# Patient Record
(demographics unavailable — no encounter records)

---

## 2024-11-18 NOTE — REVIEW OF SYSTEMS
[Sore Throat] : sore throat [Negative] : Genitourinary [Fever] : no fever [Headache] : no headache [Eye Discharge] : no eye discharge [Eye Redness] : no eye redness [Ear Pain] : no ear pain [Nasal Discharge] : no nasal discharge [Nasal Congestion] : no nasal congestion [Vomiting] : no vomiting [Weakness] : no weakness [Myalgia] : no myalgia [Rash] : no rash

## 2024-11-18 NOTE — PHYSICAL EXAM
[Erythematous Oropharynx] : erythematous oropharynx [Supple] : supple [Clear to Auscultation Bilaterally] : clear to auscultation bilaterally [NL] : warm, clear [Inflamed Tongue] : tongue not inflamed [Enlarged Tonsils] : tonsils not enlarged [Vesicles] : no vesicles [Exudate] : no exudate [Palate petechiae] : palate without petechiae [de-identified] : minimal

## 2024-11-18 NOTE — HISTORY OF PRESENT ILLNESS
[de-identified] : sore throat [FreeTextEntry6] : presents with a new onset sore throat today school sent her out no fever usual potential exposures

## 2024-12-16 NOTE — HISTORY OF PRESENT ILLNESS
[de-identified] : oozing from L earlobe, on and off  [FreeTextEntry6] : oozing from lobe, started 6 months ago comes and goes can be pussy

## 2024-12-16 NOTE — PHYSICAL EXAM
[NL] : warm, clear [FreeTextEntry3] : earlobe earring hole open with some oozing from neighboring small hematoma/abscess, drainage a bit yellow localized near hole

## 2024-12-16 NOTE — DISCUSSION/SUMMARY
[FreeTextEntry1] : local abscess/hematoma near lobe drained mupirocin keep clean and irrigate wear loops f/u if sx worsen

## 2025-01-04 NOTE — REVIEW OF SYSTEMS
[Fever] : fever [Chills] : chills [Malaise] : malaise [Negative] : Genitourinary [Headache] : no headache [Eye Discharge] : no eye discharge [Ear Pain] : no ear pain [Nasal Discharge] : no nasal discharge [Sore Throat] : no sore throat [Cough] : no cough [Vomiting] : no vomiting [Diarrhea] : no diarrhea

## 2025-01-04 NOTE — DISCUSSION/SUMMARY
[FreeTextEntry1] : Recommend supportive care including antipyretics, fluids, rest, and nasal saline followed by nasal suction. Discussed risks & benefits of oseltamivir. Potential side effect of oseltamivir includes but not limited to nausea, vomiting, and sleep disruption. + FLU A  patient refuses med.

## 2025-01-04 NOTE — HISTORY OF PRESENT ILLNESS
[de-identified] : 103 and flu symptoms [FreeTextEntry6] : presents with 1 day hx no flu vaccine as yet

## 2025-01-09 NOTE — PHYSICAL EXAM
[Erythema] : erythema [Purulent Effusion] : purulent effusion [Clear to Auscultation Bilaterally] : clear to auscultation bilaterally [NL] : warm, clear [FreeTextEntry3] : red/yellow effusions no kristine purulence

## 2025-01-09 NOTE — REVIEW OF SYSTEMS
[Ear Pain] : ear pain [Cough] : cough [Negative] : Genitourinary [Fever] : no fever [Headache] : no headache [Eye Discharge] : no eye discharge [Eye Redness] : no eye redness [Nasal Discharge] : no nasal discharge [Nasal Congestion] : no nasal congestion [Sore Throat] : no sore throat

## 2025-01-09 NOTE — HISTORY OF PRESENT ILLNESS
[de-identified] : flu A and cough and earache [FreeTextEntry6] : patient was diagnosed with the flu A last weekend here. has had a persistent cough no fevers and earache for past few days.

## 2025-01-09 NOTE — DISCUSSION/SUMMARY
[FreeTextEntry1] : B/L effusions and inflammation essentially BOM  will treat as such other symptoms are flu related and will resolve in coming days follow up 10 days

## 2025-01-16 NOTE — HISTORY OF PRESENT ILLNESS
[de-identified] : re ck ears and chest [FreeTextEntry6] : On Amoxil d 7 On Zmax d 5 dx w BOM and Pneumonia doing much better since on Zmax feels tired, sent home today from school due to fatigue

## 2025-02-13 NOTE — DISCUSSION/SUMMARY
[FreeTextEntry1] : FLU GIVEN TODAY Provided counseling on the diseases to be vaccinated against as well as the risks/benefits of providing and withholding recommended vaccines to be given today to BETTIE .All questions were answered and the parent verbalized understanding.

## 2025-04-14 NOTE — ADDENDUM
[FreeTextEntry1] : Attending Statement: Pt seen and evaluated by NP student Odessa Patton followed by me. History reviewed. Discussed and agree with clinician's assessment and plan.

## 2025-04-14 NOTE — PLAN
[Contact was Attempted] : contact was attempted [TextBox_9] : linkage to therapy, pediatric neurology appt on 5/20 at 1 PM and [place] scales provided for ADHD evaluation  [TextBox_11] : none [TextBox_26] : self-referred - school HIPAA not signed

## 2025-04-14 NOTE — HISTORY OF PRESENT ILLNESS
[FreeTextEntry1] : Robel Greene is an 8-year-old female in the 3rd grade at [employer]. She lives with her mother, father, and pet cat. 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 She has no known family psychiatric history and no prior psychiatric treatment. She previously participated in therapy but stopped in June 2023 after therapist left practice; support with the school psychologist continued through June 2024. She is currently not engaged in any therapeutic services, and her mother is seeking assistance in reconnecting with mental health resources.  During the clinical interview, Robel presented as calm, cooperative, and developmentally appropriate. She expressed emotional vulnerability, stating that "small things give me big emotions," including sadness, anger, or anxiety. She describes experiencing intrusive "what if" thoughts, which she recognizes may not be realistic but still cause significant worry. These tend to occur when she feels like she's in trouble or when peers are unkind. Robel reports being able to cope by talking to friends or listening to music, depending on her environment. She also describes moments when her "inside feels fast but outside is slow," indicating internal restlessness or racing thoughts. Attention is described as fair; she often needs to express herself verbally ("get all her talking out") before she can focus in class. There is one reported incident where she hit her head out of frustration following an episode of embarrassment. She expressed regret and a desire to "go back in time" afterward. She denies current self-harm behavior, but the incident reflects poor frustration tolerance and emotional impulsivity.  Robel reports good sleep and increased appetite, which she attributes to a "growth spurt." She denies bullying, abuse, depressive, manic, or psychotic symptoms. No acute mood or safety concerns noted at this time. Denies current self-harm. Denies past or present suicidal ideation, intent, or plan. Denies past or present homicidal ideation, intent, or plan.  Collateral from Mother: Mother reports Robel had a speech delay in early childhood, primarily expressive rather than receptive. Emotional outbursts (e.x, going from "0 to 100") remain a concern, potentially tied to frustration from speech difficulties and challenges with impulse control. Mother shared that Robel often forms rigid internal rules or expectations and becomes upset when they are not met (e.x, refusing birthday cake due to missing the song). Emotional dysregulation appears more prominent in the school setting. Mother is currently seeking outpatient therapy and developmental evaluation for support. [FreeTextEntry2] : hx of therapy  [FreeTextEntry3] : none

## 2025-04-14 NOTE — PHYSICAL EXAM
[Normal] : normal [None] : none [Cooperative] : cooperative [Euthymic] : euthymic [Full] : full [Clear] : clear [Linear/Goal Directed] : linear/goal directed [Preoccupations/Ruminations] : preoccupations/ruminations [Average] : average [WNL] : within normal limits [de-identified] : slightly fidgety

## 2025-05-19 NOTE — HISTORY OF PRESENT ILLNESS
[Premenarchal] : premenarchal [Headaches] : no headaches [Visual Symptoms] : no ~T visual symptoms [Polyuria] : no polyuria [Polydipsia] : no polydipsia [FreeTextEntry2] :  Robel is an 8 year old female who presents for follow up of central DI   In 11/2017 she developed intermittent polydipsia with polyuria. Mother states that PMD evaluated patient in February of 2018 and found urine to be dilute with undetectable  on 2/9/18. Mother states that at that time she was soaking one diaper per hour and drinking over 50oz per day of milk and water. MRI performed on 3/6/18 that showed a normal pituitary gland for age. Patient was evaluated by [facility] for DI at 22 months of age (5/10/18). Patient was on DDAVP 0.025 mg daily that was increased to 0.05 mg daily on 5/10/18 that is given at 7PM. In June 2018 she increased to DDAVP 0.075mg x1 week when she was ill with URI and dose was changed to 0.05 mg once a day and then 0.05 mg BID in the beginning of July 2018.  At the initial visit in July 2018 her MRI was normal and other pituitary screening were normal. On review of growth charts, her weight was consistently at the 50th percentile then gradually decreased to the 25th percentile at 1 years of age. Patient's length noted to decline from the 75th percentile with gradual decline to the 50th percentile. Urine in 7/18 showed specific gravity of 1.004, Na 136.  Robel is a very active child, she is doing well developmentally. She did have a speech evaluation and the family was told there was mild delay that does not warrant any therapy. Robel was seen by neuro for unusual arm movements noted on a video. There was no evidence of seizures. Robel underwent surgery to have myringotomy tubes placed and an adenoidectomy. The surgery went well and there were no issues with the diabetes insipidus.  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 Last MRI August 2023. During follow up Robel had needed increase in her DDAVP dose. In 2023 she received an extra dose by mistake, subsequent dose was held and then she required no further doses, The diminished need for medication was abrupt as she had missed a dose shortly before this and had polyuria in school. Today Robel has been doing well overall. Has been off DDAVP since July 2023 and doing very well.  Follow up labs have all been normal. She has a good energy level .  She had a transient episode of increased thirst that appetite which resolved, Robel does not wake up feeling thirsty in the morning.  She feels that she goes to the bathroom the same as everybody else in school Robel is in therapy as she is having some issues with interpersonal relationships in school, this appears to not be an issue specifically for Robel as multiple other children in the class are also having some issues this year

## 2025-05-19 NOTE — PHYSICAL EXAM
[Healthy Appearing] : healthy appearing [Well Nourished] : well nourished [Interactive] : interactive [Well formed] : well formed [Normally Set] : normally set [Normal S1 and S2] : normal S1 and S2 [Clear to Ausculation Bilaterally] : clear to auscultation bilaterally [Abdomen Soft] : soft [Abdomen Tenderness] : non-tender [] : no hepatosplenomegaly [1] : was Arun stage 1 [Normal for Age] : was normal for age [Normal Appearance] : normal in appearance [Normal] : normal  [Murmur] : no murmurs

## 2025-05-19 NOTE — DISCUSSION/SUMMARY
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  This is a very unusual presentation.  Pituitary functions have been normal and last repeat MRI is also normal.  Robel has been doing excellent off of therapy with normal labs. Given patient may have tendency to drink more when active, she may have some residual changes in her  axis that either makes her thirst response more robust given her history or makes her set point to respond to hypernatremia slightly higher than normal. Given patient is asymptomatic and regulating appropriately on her own, will continue to monitor.   Plan: Repeat MRI next summer Mother to call in 2 months for repeat lab order Mother also instructed to call if patient becomes sick, especially severe illness causing vomiting, diarrhea or dehydration.  RTC in 6 months

## 2025-05-19 NOTE — HISTORY OF PRESENT ILLNESS
[Premenarchal] : premenarchal [Headaches] : no headaches [Visual Symptoms] : no ~T visual symptoms [Polyuria] : no polyuria [Polydipsia] : no polydipsia [FreeTextEntry2] :  Robel is an 8 year old female who presents for follow up of central DI   In 11/2017 she developed intermittent polydipsia with polyuria. Mother states that PMD evaluated patient in February of 2018 and found urine to be dilute with undetectable  on 2/9/18. Mother states that at that time she was soaking one diaper per hour and drinking over 50oz per day of milk and water. MRI performed on 3/6/18 that showed a normal pituitary gland for age. Patient was evaluated by [facility] for DI at 22 months of age (5/10/18). Patient was on DDAVP 0.025 mg daily that was increased to 0.05 mg daily on 5/10/18 that is given at 7PM. In June 2018 she increased to DDAVP 0.075mg x1 week when she was ill with URI and dose was changed to 0.05 mg once a day and then 0.05 mg BID in the beginning of July 2018.  At the initial visit in July 2018 her MRI was normal and other pituitary screening were normal. On review of growth charts, her weight was consistently at the 50th percentile then gradually decreased to the 25th percentile at 1 years of age. Patient's length noted to decline from the 75th percentile with gradual decline to the 50th percentile. Urine in 7/18 showed specific gravity of 1.004, Na 136.  Robel is a very active child, she is doing well developmentally. She did have a speech evaluation and the family was told there was mild delay that does not warrant any therapy. Robel was seen by neuro for unusual arm movements noted on a video. There was no evidence of seizures. Robel underwent surgery to have myringotomy tubes placed and an adenoidectomy. The surgery went well and there were no issues with the diabetes insipidus.  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 Last MRI August 2023. During follow up Robel had needed increase in her DDAVP dose. In 2023 she received an extra dose by mistake, subsequent dose was held and then she required no further doses, The diminished need for medication was abrupt as she had missed a dose shortly before this and had polyuria in school. Today Robel has been doing well overall. Has been off DDAVP since July 2023 and doing very well.  Follow up labs have all been normal. She has a good energy level .  She had a transient episode of increased thirst that appetite which resolved, Robel does not wake up feeling thirsty in the morning.  She feels that she goes to the bathroom the same as everybody else in school Robel is in therapy as she is having some issues with interpersonal relationships in school, this appears to not be an issue specifically for Robel as multiple other children in the class are also having some issues this year
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
## 2025-05-28 NOTE — DISCUSSION/SUMMARY
[FreeTextEntry1] : discussed with mother and then with mother and child stressed the importance of meals and hydration baldemar and school explored therapist and speaking to a diff person about feelings explored past and ways to improve social interactions cough-observe may try mucinex encouraged extracurricular activities  time spent face to face 40m

## 2025-05-30 NOTE — PHYSICAL EXAM
[NL] : warm, clear [Wheezing] : no wheezing [Rales] : no rales [FreeTextEntry7] : coarse breath sounds

## 2025-07-01 NOTE — CONSULT LETTER
[Dear  ___] : Dear  [unfilled], [Consult Letter:] : I had the pleasure of evaluating your patient, [unfilled]. [Please see my note below.] : Please see my note below. [Consult Closing:] : Thank you very much for allowing me to participate in the care of this patient.  If you have any questions, please do not hesitate to contact me. [FreeTextEntry3] : Volodymyr Giang    Certified Psychiatric Nurse Practitioner Pediatric Neurology [facility]

## 2025-07-01 NOTE — DATA REVIEWED
[FreeTextEntry1] : [place] questionnaires were completed by parents and teacher.  Parents responses: Inattention 0/9 Hyperactivity 0/9 ODD: 2/8 Conduct disorder: 0/14 Anxiety/ Depression: 3/7  Teachers responses: Inattention 0/9 Hyperactivity 0/9 ODD/ Conduct: 2/10 Anxiety/ Depression: 1/7  - ADHD Performance questions: Parents: mathematics, relationship with peers, participation in organized activities  Teachers: relationship with peers

## 2025-07-01 NOTE — REVIEW OF SYSTEMS
[Patient Intake Form Reviewed] : patient intake form reviewed [Normal] : Hematologic/Lymphatic [FreeTextEntry8] : see hpi  [de-identified] : see hpi

## 2025-07-01 NOTE — END OF VISIT
[Time Spent: ___ minutes] : I have spent [unfilled] minutes of time on the encounter which excludes teaching and separately reported services. [FreeTextEntry3] :  I, Dr. Whitlock, personally performed the evaluation and management (E/M) services for this new patient.? That E/M includes conducting the clinically appropriate initial history &/or exam, assessing all conditions, and establishing the plan of care.? Today, my SPENCER, Layar was here to observe my evaluation and management service for this patient & follow plan of care established by me going forward.

## 2025-07-01 NOTE — ASSESSMENT
[FreeTextEntry1] : BETTIE is a 8 y/o female presenting for behavioral and attention concerns. She had ST for difficulty with expressive speech. She has a medical plan in school for diabetes insipidus. Patient is currently seeing a therapist at [facility] in [place] where she will be undergoing evaluation for anxiety/OCD on July 9, 2025, Patient was screened for problems such as anxiety, depression, and sleep problems.    [place] parent and teacher forms reviewed. Patient does not meet criteria for ADHD.

## 2025-07-01 NOTE — REVIEW OF SYSTEMS
[Patient Intake Form Reviewed] : patient intake form reviewed [Normal] : Hematologic/Lymphatic [FreeTextEntry8] : see hpi  [de-identified] : see hpi

## 2025-07-01 NOTE — HISTORY OF PRESENT ILLNESS
[FreeTextEntry1] : ROBEL is a 10 y/o female here for an initial visit for inattention and behavioral concerns. Patient lives with parents and is the only child. Robel has a good relationship with her parents.   Early development: ROBEL was born full term via . She was discharged home with mother. EI-Expressive ST which started at 3 years old until the 1st grade.   Robel has an appointment with [facility] in [place] on  where she is going to be evaluated for anxiety/OCD. She is also receiving therapy at the same clinic.   Teachers have no concerns for inattention/hyperactivity. Mom reported no inattention concerns either. She would just like to rule out ADHD.   EDUCATION: -School: 4th grade at [employer]  -Public school -HX of [employer] for school due to diabetes insipidus  -General education classroom -Academics-3s, and 4s.  HOME BEHAVIORS: Mom is not reporting any inattention/hyperactivity. Robel has difficulty with regulating her emotions. Mom wants to rule out ADHD.   RELATIONSHIPS: Gets along well with peers with some strained moments. Robel is big on being a perfectionist.   EMOTIONAL Denies anxiety or depression. Robel denies any current SI, intent or plan. Mom also offers no acute safety concerns.   SLEEP Goes to bed at 8:30PM and 7AM   ACTIVITIES/INTERESTS: swimming, soccer, art class and theater   MEDICAL HISTORY:  Denies cardiac hx Denies a history of tics Denies history of starting spells, twitching, seizure-like activity.  Allergies: Denies  Current Meds: None   FAMILY HISTORY: Family history of ADHD: None  Family history of anxiety or depression: Maternal and Paternal sides have anxiety and depression  Denies family cardiac history or unexplained deaths before age of 40-50

## 2025-07-01 NOTE — CONSULT LETTER
[Dear  ___] : Dear  [unfilled], [Consult Letter:] : I had the pleasure of evaluating your patient, [unfilled]. [Please see my note below.] : Please see my note below. [Consult Closing:] : Thank you very much for allowing me to participate in the care of this patient.  If you have any questions, please do not hesitate to contact me. [FreeTextEntry3] : Volodymyr Giang    Certified Psychiatric Nurse Practitioner Pediatric Neurology [facility]

## 2025-07-01 NOTE — DATA REVIEWED
[FreeTextEntry1] : [place] questionnaires were completed by parents and teacher.  Parents responses: Inattention 0/9 Hyperactivity 0/9 ODD: 2/8 Conduct disorder: 0/14 Anxiety/ Depression: 3/7  Teachers responses: Inattention 0/9 Hyperactivity 0/9 ODD/ Conduct: 2/10 Anxiety/ Depression: 1/7  - ADHD Performance questions: Parents: mathematics, relationship with peers, participation in organized activities  Teachers: relationship with peers

## 2025-07-01 NOTE — PLAN
[FreeTextEntry1] : -Follow up with psychiatrist at [facility] for anxiety/OCD  -Continue CBT at [facility] -Mom given [facility] information -Follow up as needed

## 2025-07-01 NOTE — CONSULT LETTER
[Dear  ___] : Dear  [unfilled], [Consult Letter:] : I had the pleasure of evaluating your patient, [unfilled]. [Please see my note below.] : Please see my note below. [Consult Closing:] : Thank you very much for allowing me to participate in the care of this patient.  If you have any questions, please do not hesitate to contact me. [FreeTextEntry3] : Volodymyr Giang    Certified Psychiatric Nurse Practitioner Pediatric Neurology [facility]

## 2025-07-01 NOTE — DATA REVIEWED
[FreeTextEntry1] : [place] questionnaires were completed by parents and teacher.  Parents responses: Inattention 0/9 Hyperactivity 0/9 ODD: 2/8 Conduct disorder: 0/14 Anxiety/ Depression: 3/7  Teachers responses: Inattention 0/9 Hyperactivity 0/9 ODD/ Conduct: 2/10 Anxiety/ Depression: 1/7  - ADHD Performance questions: Parents: mathematics, relationship with peers, participation in organized activities  Teachers: relationship with peers

## 2025-07-01 NOTE — HISTORY OF PRESENT ILLNESS
[FreeTextEntry1] : ROBEL is a 8 y/o female here for an initial visit for inattention and behavioral concerns. Patient lives with parents and is the only child. Robel has a good relationship with her parents.   Early development: ROBEL was born full term via . She was discharged home with mother. EI-Expressive ST which started at 3 years old until the 1st grade.   Robel has an appointment with [facility] in [place] on  where she is going to be evaluated for anxiety/OCD. She is also receiving therapy at the same clinic.   Teachers have no concerns for inattention/hyperactivity. Mom reported no inattention concerns either. She would just like to rule out ADHD.   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  HOME BEHAVIORS: Mom is not reporting any inattention/hyperactivity. Robel has difficulty with regulating her emotions. Mom wants to rule out ADHD.   RELATIONSHIPS: Gets along well with peers with some strained moments. Robel is big on being a perfectionist.   EMOTIONAL Denies anxiety or depression. Robel denies any current SI, intent or plan. Mom also offers no acute safety concerns.   SLEEP Goes to bed at 8:30PM and 7AM   ACTIVITIES/INTERESTS: swimming, soccer, art class and theater   MEDICAL HISTORY:  Denies cardiac hx Denies a history of tics Denies history of starting spells, twitching, seizure-like activity.  Allergies: Denies  Current Meds: None   FAMILY HISTORY: Family history of ADHD: None  Family history of anxiety or depression: Maternal and Paternal sides have anxiety and depression  Denies family cardiac history or unexplained deaths before age of 40-50

## 2025-07-01 NOTE — ASSESSMENT
[FreeTextEntry1] : BETTIE is a 10 y/o female presenting for behavioral and attention concerns. She had ST for difficulty with expressive speech. She has a medical plan in school for diabetes insipidus. Patient is currently seeing a therapist at [facility] in [place] where she will be undergoing evaluation for anxiety/OCD on July 9, 2025, Patient was screened for problems such as anxiety, depression, and sleep problems.    [place] parent and teacher forms reviewed. Patient does not meet criteria for ADHD.

## 2025-07-01 NOTE — ASSESSMENT
[FreeTextEntry1] : BETTIE is a 10 y/o female presenting for behavioral and attention concerns. She had ST for difficulty with expressive speech. She has a medical plan in school for diabetes insipidus. Patient is currently seeing a therapist at [facility] in [place] where she will be undergoing evaluation for anxiety/OCD on July 9, 2025, Patient was screened for problems such as anxiety, depression, and sleep problems.    [place] parent and teacher forms reviewed. Patient does not meet criteria for ADHD.

## 2025-07-01 NOTE — PLAN
[FreeTextEntry1] : -Follow up with psychiatrist at [facility] for anxiety/OCD  -Continue CBT at [facility] -Mom given [facility] information -Follow up as needed

## 2025-07-01 NOTE — PLAN
[FreeTextEntry1] : -Follow up with psychiatrist at [facility] for anxiety/OCD  -Continue CBT at [facility] -Mom given [facility] information -Follow up as needed

## 2025-07-01 NOTE — HISTORY OF PRESENT ILLNESS
[FreeTextEntry1] : ROBEL is a 10 y/o female here for an initial visit for inattention and behavioral concerns. Patient lives with parents and is the only child. Robel has a good relationship with her parents.   Early development: ROBEL was born full term via . She was discharged home with mother. EI-Expressive ST which started at 3 years old until the 1st grade.   Robel has an appointment with [facility] in [place] on  where she is going to be evaluated for anxiety/OCD. She is also receiving therapy at the same clinic.   Teachers have no concerns for inattention/hyperactivity. Mom reported no inattention concerns either. She would just like to rule out ADHD.   EDUCATION: -School: 4th grade at [employer]  -Public school -HX of Medical Plan for school due to diabetes insipidus  -General education classroom -Academics-3s, and 4s.  HOME BEHAVIORS: Mom is not reporting any inattention/hyperactivity. Robel has difficulty with regulating her emotions. Mom wants to rule out ADHD.   RELATIONSHIPS: Gets along well with peers with some strained moments. Robel is big on being a perfectionist.   EMOTIONAL Denies anxiety or depression. Robel denies any current SI, intent or plan. Mom also offers no acute safety concerns.   SLEEP Goes to bed at 8:30PM and 7AM   ACTIVITIES/INTERESTS: swimming, soccer, art class and theater   MEDICAL HISTORY:  Denies cardiac hx Denies a history of tics Denies history of starting spells, twitching, seizure-like activity.  Allergies: Denies  Current Meds: None   FAMILY HISTORY: Family history of ADHD: None  Family history of anxiety or depression: Maternal and Paternal sides have anxiety and depression  Denies family cardiac history or unexplained deaths before age of 40-50

## 2025-07-01 NOTE — END OF VISIT
[Time Spent: ___ minutes] : I have spent [unfilled] minutes of time on the encounter which excludes teaching and separately reported services. [FreeTextEntry3] :  I, Dr. Whitlock, personally performed the evaluation and management (E/M) services for this new patient.? That E/M includes conducting the clinically appropriate initial history &/or exam, assessing all conditions, and establishing the plan of care.? Today, my SPENCER, Antengo was here to observe my evaluation and management service for this patient & follow plan of care established by me going forward.

## 2025-07-01 NOTE — REVIEW OF SYSTEMS
[Patient Intake Form Reviewed] : patient intake form reviewed [Normal] : Hematologic/Lymphatic [FreeTextEntry8] : see hpi  [de-identified] : see hpi

## 2025-07-21 NOTE — DISCUSSION/SUMMARY
[Normal Growth] : growth [Normal Development] : development [None] : No known medical problems [No Elimination Concerns] : elimination [No Feeding Concerns] : feeding [No Skin Concerns] : skin [Normal Sleep Pattern] : sleep [School] : school [Development and Mental Health] : development and mental health [Nutrition and Physical Activity] : nutrition and physical activity [Oral Health] : oral health [Safety] : safety [No Medications] : ~He/She~ is not on any medications [Patient] : patient [Full Activity without restrictions including Physical Education & Athletics] : Full Activity without restrictions including Physical Education & Athletics [] : The components of the vaccine(s) to be administered today are listed in the plan of care. The disease(s) for which the vaccine(s) are intended to prevent and the risks have been discussed with the caretaker.  The risks are also included in the appropriate vaccination information statements which have been provided to the patient's caregiver.  The caregiver has given consent to vaccinate. [FreeTextEntry1] : VACCINE UP TO DATE Provided counseling on the diseases to be vaccinated against as well as the risks/benefits of providing and withholding recommended vaccines to be given today to BETTIE .All questions were answered and the parent verbalized understanding. REC FLU VAC IN FALL  RECENT LABS DONE BY NEUROLOGIST  HEARING NL  VISION SEES OPTHO  TB risk assessment completed- no risk for TB. PPD not required   Discussed safety/feeding/sleep as appropriate for age.  Time allowed for questions and all answered with understanding.

## 2025-07-21 NOTE — HISTORY OF PRESENT ILLNESS
[Mother] : mother [Normal] : Normal [Brushing teeth twice/d] : brushing teeth twice per day [Yes] : Patient goes to dentist yearly [Premenarche] : premenarche [Playtime (60 min/d)] : playtime 60 min a day [Grade ___] : Grade [unfilled] [No] : No cigarette smoke exposure [Appropriately restrained in motor vehicle] : appropriately restrained in motor vehicle [Supervised outdoor play] : supervised outdoor play [Supervised around water] : supervised around water [Wears helmet and pads] : wears helmet and pads [Parent knows child's friends] : parent knows child's friends [Parent discusses safety rules regarding adults] : parent discusses safety rules regarding adults [Family discusses home emergency plan] : family discusses home emergency plan [Monitored computer use] : monitored computer use [Up to date] : Up to date [Exposure to alcohol] : no exposure to alcohol [Exposure to tobacco] : no exposure to tobacco [Exposure to electronic nicotine delivery system] : No exposure to electronic nicotine delivery system [Exposure to illicit drugs] : no exposure to illicit drugs [FreeTextEntry7] : 8 YO WELL EXAM [de-identified] : doing well [de-identified] : pixky [FreeTextEntry1] : SEEN BY NEUROL - DOES NOT HAVE ADHD ISSUES W ANXIETY CHILD IS CONCERNED ABOUT HER WEIGHT, DOESNT WANT TO KNOW NUMBER  DIABETES INSIPIDUS DX UNCLEAR PER MOM NO LONGER HAVING ANY ISSUES

## 2025-07-22 NOTE — DISCUSSION/SUMMARY
[FreeTextEntry1] : LOM Complete antibiotic course. Potential side effect of antibiotics includes but not limited to diarrhea. Provide ibuprofen as needed for pain or fever. If no improvement within 48 hours return for re-evaluation. Follow up in 2-3 wks for tympanometry.